# Patient Record
Sex: MALE | Race: OTHER | Employment: OTHER
[De-identification: names, ages, dates, MRNs, and addresses within clinical notes are randomized per-mention and may not be internally consistent; named-entity substitution may affect disease eponyms.]

---

## 2022-03-20 PROBLEM — C61 PROSTATE CA (HCC): Status: ACTIVE | Noted: 2020-02-03

## 2024-02-21 RX ORDER — LIDOCAINE HYDROCHLORIDE 10 MG/ML
10 INJECTION, SOLUTION INFILTRATION; PERINEURAL ONCE
Status: COMPLETED | OUTPATIENT
Start: 2024-03-01 | End: 2024-03-01

## 2024-03-01 ENCOUNTER — HOSPITAL ENCOUNTER (OUTPATIENT)
Facility: HOSPITAL | Age: 74
End: 2024-03-01
Payer: MEDICARE

## 2024-03-01 DIAGNOSIS — R97.20 ELEVATED PSA: ICD-10-CM

## 2024-03-01 PROCEDURE — C1729 CATH, DRAINAGE: HCPCS

## 2024-03-01 PROCEDURE — G0416 PROSTATE BIOPSY, ANY MTHD: HCPCS

## 2024-03-01 PROCEDURE — 55700 US BIOPSY PROSTATE NEEDLE/PUNCH: CPT | Performed by: UROLOGY

## 2024-03-01 PROCEDURE — 2500000003 HC RX 250 WO HCPCS: Performed by: UROLOGY

## 2024-03-01 RX ADMIN — LIDOCAINE HYDROCHLORIDE ANHYDROUS 10 ML: 10 INJECTION, SOLUTION INFILTRATION at 13:49

## 2024-03-01 NOTE — PROCEDURES
Gordy Godwin is a 73 y.o. male patient.      Date of Procedure: 3/1/2024     Pre-Op Diagnosis: Prostate Cancer     Post-Op Diagnosis: Same as preoperative diagnosis.       Procedure:  Transrectal US, Prostate biopsy     Surgical Assistant: None     Anesthesia: Local -- 10ml 1% lidocaine     Estimated Blood Loss (mL): Minimal     Complications: None     Specimens:   Prostate biopsies:  Left apex, left middle, left base  Right apex, right middle, right base     Implants: * No surgical log found *     Drains: * No LDAs found *     Findings: 80.73 ml volume,  no hypoechoic lesions        Detailed Description of Procedure:      After informed consent was obtained, the patient was taken to the ultrasound room and placed in the dorsal lithotomy position.       A transrectal ultrasound probe was then inserted transanally without difficulty into the rectum.  Ultrasound pictures were taken of the prostate in transverse and longitudinal views.  The prostate was measured using the standard Eliptoid formula and was found to be 80.73 mL in size.  The ultrasound views were obtained of the seminal vesicles, which were normal; the transition zone, which was normal; the right periprosthetic tissue, which was normal; and the left periprosthetic tissue, which was normal.  The left apex, mid and base, showed no hypoechoic lesions or areas of abnormality.  10ml of 1% lidocaine without epinephrine were used to infiltrate the periprostatic tissue.        At this point, using ultrasound guidance, biopsies were taken. At the left base, 2 biopsies were taken. At the left mid 2 biopsies were taken. At the left apex 2 were taken.  Then, 2 biopsies were taken at the right base, 2 biopsies were taken at the right mid, and 2 biopsies taken at the right apex.  At this point, the ultrasound probe was removed.  There was no active bleeding.  The patient was taken out of lithotomy position and walked to checkout.       NADIA CORTES MD  3/1/2024

## 2024-03-12 ENCOUNTER — HOSPITAL ENCOUNTER (OUTPATIENT)
Facility: HOSPITAL | Age: 74
Discharge: HOME | End: 2024-03-15
Payer: MEDICARE

## 2024-03-12 DIAGNOSIS — Z01.818 PRE-OP TESTING: Primary | ICD-10-CM

## 2024-03-12 LAB
ANION GAP SERPL CALC-SCNC: 2 MMOL/L (ref 3–18)
BUN SERPL-MCNC: 20 MG/DL (ref 7–18)
BUN/CREAT SERPL: 24 (ref 12–20)
CALCIUM SERPL-MCNC: 9.2 MG/DL (ref 8.5–10.1)
CHLORIDE SERPL-SCNC: 107 MMOL/L (ref 100–111)
CO2 SERPL-SCNC: 31 MMOL/L (ref 21–32)
CREAT SERPL-MCNC: 0.82 MG/DL (ref 0.6–1.3)
EKG ATRIAL RATE: 48 BPM
EKG DIAGNOSIS: NORMAL
EKG P AXIS: 39 DEGREES
EKG P-R INTERVAL: 164 MS
EKG Q-T INTERVAL: 474 MS
EKG QRS DURATION: 130 MS
EKG QTC CALCULATION (BAZETT): 423 MS
EKG R AXIS: 98 DEGREES
EKG T AXIS: 54 DEGREES
EKG VENTRICULAR RATE: 48 BPM
ERYTHROCYTE [DISTWIDTH] IN BLOOD BY AUTOMATED COUNT: 13.6 % (ref 11.6–14.5)
GLUCOSE SERPL-MCNC: 90 MG/DL (ref 74–99)
HCT VFR BLD AUTO: 39 % (ref 36–48)
HGB BLD-MCNC: 13.1 G/DL (ref 13–16)
MCH RBC QN AUTO: 31.7 PG (ref 24–34)
MCHC RBC AUTO-ENTMCNC: 33.6 G/DL (ref 31–37)
MCV RBC AUTO: 94.4 FL (ref 78–100)
NRBC # BLD: 0 K/UL (ref 0–0.01)
NRBC BLD-RTO: 0 PER 100 WBC
PLATELET # BLD AUTO: 231 K/UL (ref 135–420)
PMV BLD AUTO: 10.8 FL (ref 9.2–11.8)
POTASSIUM SERPL-SCNC: 4.6 MMOL/L (ref 3.5–5.5)
RBC # BLD AUTO: 4.13 M/UL (ref 4.35–5.65)
SODIUM SERPL-SCNC: 140 MMOL/L (ref 136–145)
WBC # BLD AUTO: 6.1 K/UL (ref 4.6–13.2)

## 2024-03-12 PROCEDURE — 80048 BASIC METABOLIC PNL TOTAL CA: CPT

## 2024-03-12 PROCEDURE — 85027 COMPLETE CBC AUTOMATED: CPT

## 2024-03-12 PROCEDURE — 93005 ELECTROCARDIOGRAM TRACING: CPT | Performed by: UROLOGY

## 2024-03-29 RX ORDER — MITOMYCIN 40 MG/80ML
40 INJECTION, POWDER, LYOPHILIZED, FOR SOLUTION INTRAVENOUS ONCE
Status: COMPLETED | OUTPATIENT
Start: 2024-04-02 | End: 2024-04-02

## 2024-03-29 RX ORDER — MITOMYCIN 40 MG/80ML
40 INJECTION, POWDER, LYOPHILIZED, FOR SOLUTION INTRAVENOUS ONCE
Status: CANCELLED | OUTPATIENT
Start: 2024-03-29 | End: 2024-03-29 | Stop reason: SDUPTHER

## 2024-04-02 ENCOUNTER — HOSPITAL ENCOUNTER (OUTPATIENT)
Facility: HOSPITAL | Age: 74
Setting detail: OUTPATIENT SURGERY
Discharge: HOME | End: 2024-04-02
Attending: UROLOGY | Admitting: UROLOGY
Payer: MEDICARE

## 2024-04-02 ENCOUNTER — ANESTHESIA EVENT (OUTPATIENT)
Facility: HOSPITAL | Age: 74
End: 2024-04-02
Payer: MEDICARE

## 2024-04-02 ENCOUNTER — ANESTHESIA (OUTPATIENT)
Facility: HOSPITAL | Age: 74
End: 2024-04-02
Payer: MEDICARE

## 2024-04-02 VITALS
OXYGEN SATURATION: 98 % | RESPIRATION RATE: 16 BRPM | TEMPERATURE: 97 F | HEIGHT: 72 IN | HEART RATE: 57 BPM | BODY MASS INDEX: 28.27 KG/M2 | DIASTOLIC BLOOD PRESSURE: 85 MMHG | WEIGHT: 208.7 LBS | SYSTOLIC BLOOD PRESSURE: 164 MMHG

## 2024-04-02 DIAGNOSIS — C61 PROSTATE CA (HCC): Primary | ICD-10-CM

## 2024-04-02 PROCEDURE — 2720000010 HC SURG SUPPLY STERILE: Performed by: UROLOGY

## 2024-04-02 PROCEDURE — 2500000003 HC RX 250 WO HCPCS: Performed by: NURSE ANESTHETIST, CERTIFIED REGISTERED

## 2024-04-02 PROCEDURE — 88341 IMHCHEM/IMCYTCHM EA ADD ANTB: CPT

## 2024-04-02 PROCEDURE — 3700000000 HC ANESTHESIA ATTENDED CARE: Performed by: UROLOGY

## 2024-04-02 PROCEDURE — 2580000003 HC RX 258: Performed by: UROLOGY

## 2024-04-02 PROCEDURE — 88307 TISSUE EXAM BY PATHOLOGIST: CPT

## 2024-04-02 PROCEDURE — 6360000002 HC RX W HCPCS: Performed by: UROLOGY

## 2024-04-02 PROCEDURE — 7100000011 HC PHASE II RECOVERY - ADDTL 15 MIN: Performed by: UROLOGY

## 2024-04-02 PROCEDURE — 88342 IMHCHEM/IMCYTCHM 1ST ANTB: CPT

## 2024-04-02 PROCEDURE — 7100000000 HC PACU RECOVERY - FIRST 15 MIN: Performed by: UROLOGY

## 2024-04-02 PROCEDURE — 6360000002 HC RX W HCPCS: Performed by: NURSE ANESTHETIST, CERTIFIED REGISTERED

## 2024-04-02 PROCEDURE — 7100000010 HC PHASE II RECOVERY - FIRST 15 MIN: Performed by: UROLOGY

## 2024-04-02 PROCEDURE — 3600000002 HC SURGERY LEVEL 2 BASE: Performed by: UROLOGY

## 2024-04-02 PROCEDURE — 3700000001 HC ADD 15 MINUTES (ANESTHESIA): Performed by: UROLOGY

## 2024-04-02 PROCEDURE — 2709999900 HC NON-CHARGEABLE SUPPLY: Performed by: UROLOGY

## 2024-04-02 PROCEDURE — 3600000012 HC SURGERY LEVEL 2 ADDTL 15MIN: Performed by: UROLOGY

## 2024-04-02 PROCEDURE — 88305 TISSUE EXAM BY PATHOLOGIST: CPT

## 2024-04-02 RX ORDER — DIPHENHYDRAMINE HYDROCHLORIDE 50 MG/ML
12.5 INJECTION INTRAMUSCULAR; INTRAVENOUS
Status: DISCONTINUED | OUTPATIENT
Start: 2024-04-02 | End: 2024-04-02 | Stop reason: HOSPADM

## 2024-04-02 RX ORDER — HYDROMORPHONE HYDROCHLORIDE 1 MG/ML
0.25 INJECTION, SOLUTION INTRAMUSCULAR; INTRAVENOUS; SUBCUTANEOUS EVERY 5 MIN PRN
Status: DISCONTINUED | OUTPATIENT
Start: 2024-04-02 | End: 2024-04-02 | Stop reason: HOSPADM

## 2024-04-02 RX ORDER — OXYCODONE HYDROCHLORIDE 5 MG/1
5 TABLET ORAL
Status: DISCONTINUED | OUTPATIENT
Start: 2024-04-02 | End: 2024-04-02 | Stop reason: HOSPADM

## 2024-04-02 RX ORDER — DROPERIDOL 2.5 MG/ML
0.62 INJECTION, SOLUTION INTRAMUSCULAR; INTRAVENOUS
Status: DISCONTINUED | OUTPATIENT
Start: 2024-04-02 | End: 2024-04-02 | Stop reason: HOSPADM

## 2024-04-02 RX ORDER — FENTANYL CITRATE 50 UG/ML
INJECTION, SOLUTION INTRAMUSCULAR; INTRAVENOUS PRN
Status: DISCONTINUED | OUTPATIENT
Start: 2024-04-02 | End: 2024-04-02 | Stop reason: SDUPTHER

## 2024-04-02 RX ORDER — NALOXONE HYDROCHLORIDE 0.4 MG/ML
INJECTION, SOLUTION INTRAMUSCULAR; INTRAVENOUS; SUBCUTANEOUS PRN
Status: DISCONTINUED | OUTPATIENT
Start: 2024-04-02 | End: 2024-04-02 | Stop reason: HOSPADM

## 2024-04-02 RX ORDER — ONDANSETRON 2 MG/ML
INJECTION INTRAMUSCULAR; INTRAVENOUS PRN
Status: DISCONTINUED | OUTPATIENT
Start: 2024-04-02 | End: 2024-04-02 | Stop reason: SDUPTHER

## 2024-04-02 RX ORDER — ONDANSETRON 2 MG/ML
4 INJECTION INTRAMUSCULAR; INTRAVENOUS
Status: DISCONTINUED | OUTPATIENT
Start: 2024-04-02 | End: 2024-04-02 | Stop reason: HOSPADM

## 2024-04-02 RX ORDER — SODIUM CHLORIDE, SODIUM LACTATE, POTASSIUM CHLORIDE, CALCIUM CHLORIDE 600; 310; 30; 20 MG/100ML; MG/100ML; MG/100ML; MG/100ML
INJECTION, SOLUTION INTRAVENOUS CONTINUOUS
Status: DISCONTINUED | OUTPATIENT
Start: 2024-04-02 | End: 2024-04-02 | Stop reason: HOSPADM

## 2024-04-02 RX ORDER — LIDOCAINE HYDROCHLORIDE 20 MG/ML
INJECTION, SOLUTION EPIDURAL; INFILTRATION; INTRACAUDAL; PERINEURAL PRN
Status: DISCONTINUED | OUTPATIENT
Start: 2024-04-02 | End: 2024-04-02 | Stop reason: SDUPTHER

## 2024-04-02 RX ORDER — SODIUM CHLORIDE 9 MG/ML
INJECTION, SOLUTION INTRAVENOUS PRN
Status: DISCONTINUED | OUTPATIENT
Start: 2024-04-02 | End: 2024-04-02 | Stop reason: HOSPADM

## 2024-04-02 RX ORDER — CEPHALEXIN 500 MG/1
500 CAPSULE ORAL 2 TIMES DAILY
Qty: 14 CAPSULE | Refills: 0 | Status: SHIPPED | OUTPATIENT
Start: 2024-04-02 | End: 2024-04-09

## 2024-04-02 RX ORDER — ROCURONIUM BROMIDE 10 MG/ML
INJECTION, SOLUTION INTRAVENOUS PRN
Status: DISCONTINUED | OUTPATIENT
Start: 2024-04-02 | End: 2024-04-02 | Stop reason: SDUPTHER

## 2024-04-02 RX ORDER — LABETALOL HYDROCHLORIDE 5 MG/ML
10 INJECTION, SOLUTION INTRAVENOUS
Status: DISCONTINUED | OUTPATIENT
Start: 2024-04-02 | End: 2024-04-02 | Stop reason: HOSPADM

## 2024-04-02 RX ORDER — SODIUM CHLORIDE 0.9 % (FLUSH) 0.9 %
5-40 SYRINGE (ML) INJECTION PRN
Status: DISCONTINUED | OUTPATIENT
Start: 2024-04-02 | End: 2024-04-02 | Stop reason: HOSPADM

## 2024-04-02 RX ORDER — SODIUM CHLORIDE 0.9 % (FLUSH) 0.9 %
5-40 SYRINGE (ML) INJECTION EVERY 12 HOURS SCHEDULED
Status: DISCONTINUED | OUTPATIENT
Start: 2024-04-02 | End: 2024-04-02 | Stop reason: HOSPADM

## 2024-04-02 RX ORDER — TRAMADOL HYDROCHLORIDE 50 MG/1
50 TABLET ORAL EVERY 6 HOURS PRN
Qty: 12 TABLET | Refills: 0 | Status: SHIPPED | OUTPATIENT
Start: 2024-04-02 | End: 2024-04-05

## 2024-04-02 RX ORDER — PHENAZOPYRIDINE HYDROCHLORIDE 100 MG/1
100 TABLET, FILM COATED ORAL 3 TIMES DAILY PRN
Qty: 30 TABLET | Refills: 0 | Status: SHIPPED | OUTPATIENT
Start: 2024-04-02 | End: 2024-04-12

## 2024-04-02 RX ORDER — PROPOFOL 10 MG/ML
INJECTION, EMULSION INTRAVENOUS PRN
Status: DISCONTINUED | OUTPATIENT
Start: 2024-04-02 | End: 2024-04-02 | Stop reason: SDUPTHER

## 2024-04-02 RX ORDER — FENTANYL CITRATE 50 UG/ML
25 INJECTION, SOLUTION INTRAMUSCULAR; INTRAVENOUS EVERY 5 MIN PRN
Status: DISCONTINUED | OUTPATIENT
Start: 2024-04-02 | End: 2024-04-02 | Stop reason: HOSPADM

## 2024-04-02 RX ADMIN — PROPOFOL 150 MG: 10 INJECTION, EMULSION INTRAVENOUS at 07:33

## 2024-04-02 RX ADMIN — PROPOFOL 120 MG: 10 INJECTION, EMULSION INTRAVENOUS at 07:43

## 2024-04-02 RX ADMIN — LIDOCAINE HYDROCHLORIDE 100 MG: 20 INJECTION, SOLUTION EPIDURAL; INFILTRATION; INTRACAUDAL; PERINEURAL at 07:33

## 2024-04-02 RX ADMIN — FENTANYL CITRATE 25 MCG: 50 INJECTION, SOLUTION INTRAMUSCULAR; INTRAVENOUS at 08:10

## 2024-04-02 RX ADMIN — SODIUM CHLORIDE, SODIUM LACTATE, POTASSIUM CHLORIDE, AND CALCIUM CHLORIDE: 600; 310; 30; 20 INJECTION, SOLUTION INTRAVENOUS at 06:11

## 2024-04-02 RX ADMIN — MITOMYCIN 40 MG: 40 INJECTION, POWDER, LYOPHILIZED, FOR SOLUTION INTRAVENOUS at 08:23

## 2024-04-02 RX ADMIN — ONDANSETRON HYDROCHLORIDE 4 MG: 2 INJECTION INTRAMUSCULAR; INTRAVENOUS at 07:53

## 2024-04-02 RX ADMIN — FENTANYL CITRATE 25 MCG: 50 INJECTION, SOLUTION INTRAMUSCULAR; INTRAVENOUS at 07:27

## 2024-04-02 RX ADMIN — ROCURONIUM BROMIDE 20 MG: 10 INJECTION, SOLUTION INTRAVENOUS at 07:47

## 2024-04-02 RX ADMIN — FENTANYL CITRATE 25 MCG: 50 INJECTION, SOLUTION INTRAMUSCULAR; INTRAVENOUS at 08:12

## 2024-04-02 RX ADMIN — WATER 2000 MG: 1 INJECTION INTRAMUSCULAR; INTRAVENOUS; SUBCUTANEOUS at 07:38

## 2024-04-02 RX ADMIN — FENTANYL CITRATE 25 MCG: 50 INJECTION, SOLUTION INTRAMUSCULAR; INTRAVENOUS at 07:52

## 2024-04-02 RX ADMIN — SODIUM CHLORIDE, SODIUM LACTATE, POTASSIUM CHLORIDE, AND CALCIUM CHLORIDE: 600; 310; 30; 20 INJECTION, SOLUTION INTRAVENOUS at 08:14

## 2024-04-02 RX ADMIN — SUGAMMADEX 200 MG: 100 INJECTION, SOLUTION INTRAVENOUS at 08:23

## 2024-04-02 RX ADMIN — ROCURONIUM BROMIDE 30 MG: 10 INJECTION, SOLUTION INTRAVENOUS at 07:40

## 2024-04-02 ASSESSMENT — PAIN - FUNCTIONAL ASSESSMENT: PAIN_FUNCTIONAL_ASSESSMENT: 0-10

## 2024-04-02 NOTE — BRIEF OP NOTE
Brief Postoperative Note      Patient: Gordy Godwin  YOB: 1950  MRN: [id number]    Date of Procedure: 4/2/2024    Pre-Op Diagnosis Codes:     * Bladder neoplasm [D49.4]    Post-Op Diagnosis: Same       Procedure(s):  CYSTOSCOPY TRANSURETHRAL RESECTION OF BLADDER TUMOR (LARGE) WITH INTRAVESICAL INSTILLATION OF MITOMYCIN    Surgeon(s):  Jaylen Cortse MD    Assistant:  none    Anesthesia: general    Estimated Blood Loss (mL): Minimal    Complications: None    Specimens:   ID Type Source Tests Collected by Time Destination   A : bladder tumor Tissue Bladder SURGICAL PATHOLOGY Jaylen Cortes MD 4/2/2024 0837    B : bladder tumor deep Tissue Bladder SURGICAL PATHOLOGY Jaylen Cortes MD 4/2/2024 0837        Implants:  * No implants in log *      Drains:   Urinary Catheter 04/02/24 Coude (Active)   Catheter Indications Perioperative use for selected surgical procedures 04/02/24 0839   Collection Container Standard 04/02/24 0839   Catheter Best Practices  Drainage tube clipped to bed;Catheter secured to thigh;Tamper seal intact;Bag below bladder;Bag not on floor;Drainage bag less than half full;Lack of dependent loop in tubing 04/02/24 0839   Status Clamped 04/02/24 0839       Findings: 5.5cm tumor at bladder neck extending toward trigone and right lateral wall from 6-oclock position to 12 oclock.   Uo's unharmed during the resection      Electronically signed by JAYLEN CORTES MD on 4/2/2024 at 8:58 AM

## 2024-04-02 NOTE — PERIOP NOTE
TRANSFER - IN REPORT:    Verbal report received from OR RN on Gordy Gross  being received from OR for routine progression of patient care      Report consisted of patient's Situation, Background, Assessment and   Recommendations(SBAR).     Information from the following report(s) Adult Overview, Surgery Report, Intake/Output, and MAR was reviewed with the receiving nurse.    Opportunity for questions and clarification was provided.      Assessment completed upon patient's arrival to unit and care assumed.

## 2024-04-02 NOTE — H&P
[facility]  [address]  [facility] [address]  Tel:  [phone]  Fax: [phone]    Patient : Gordy Godwin   YOB: 1950                   Assessment/Plan  # Detail Type Description    1. 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    Patient Plan He has a 4 cm bladder tumor.  There was about a 2.5 cm area of papillary tumor surrounded by some base of erythema that seems raised.  We will do a cystoscopy and Transurethral resection of bladder tumor large.  We will do intravesical instillation of mitomycin.  Risks of bleeding and infection and injury and possible need for future procedures were all discussed.  We discussed that this may be curative in nature or the possibility that future procedures or interventions are necessary.  We discussed that this should provide a stage and a diagnosis         2. Assessment Gross hematuria (R31.0).    Patient Plan His hematuria evaluation is significant for a medium-sized bladder mass that is likely bladder in its origin but could theoretically be emanating from the prostate and growing into the bladder    Provider Plan     Plan Orders          3. Assessment Prostate cancer (C61).    Patient Plan          4. Assessment Feeling of incomplete bladder emptying (R39.14).    Patient Plan Previously his PVR is 35 mL week ago              Additional Visit Information    This 73 year old patient presents for Hematuria and Prostate cancer.    History of Present Illness  1.  Hematuria   The patient presents with hematuria (gross).  The problem began 1 month ago. The symptoms are intermittent. The problem is unchanged. Pertinent history includes being at least 40 years of age. Denies aggravating factors. The patient denies chills, fever, nausea and vomiting.           Comments: CT 2/2024--   1. Lobulated enhancing mass posteriorly in the urinary bladder on the right near the   ureterovesicular junction suspicious for

## 2024-04-02 NOTE — PERIOP NOTE
TRANSFER - IN REPORT:    Verbal report received from MAREN Anglin (name) on Gordy Gross  being received from PACU (unit) for routine progression of patient care      Report consisted of patient’s Situation, Background, Assessment and   Recommendations(SBAR).     Information from the following report(s) Nurse Handoff Report, Surgery Report, Intake/Output, and MAR was reviewed with the receiving nurse.    Opportunity for questions and clarification was provided.      Assessment completed upon patient’s arrival to unit and care assume

## 2024-04-02 NOTE — ANESTHESIA PRE PROCEDURE
Department of Anesthesiology  Preprocedure Note       Name:  Gordy Godwin   Age:  73 y.o.  :  1950                                          MRN:  [id number]         Date:  2024      Surgeon: Surgeon(s):  Jaylen Schroeder MD    Procedure: Procedure(s):  CYSTOSCOPY TRANSURETHRAL RESECTION OF BLADDER TUMOR MEDIUM WITH INTRAVESICAL INSTILLATION OF MITOMYCIN    Medications prior to admission:   Prior to Admission medications    Medication Sig Start Date End Date Taking? Authorizing Provider   Multiple Vitamins-Minerals (THERAPEUTIC MULTIVITAMIN-MINERALS) tablet Take 1 tablet by mouth daily    Provider, MD Jay       Current medications:    Current Facility-Administered Medications   Medication Dose Route Frequency Provider Last Rate Last Admin   • lactated ringers IV soln infusion   IntraVENous Continuous Jaylen Schroeder  mL/hr at 24 0611 New Bag at 24 0611   • ceFAZolin (ANCEF) 2,000 mg in sterile water 20 mL IV syringe  2,000 mg IntraVENous On Call to OR Jaylen Schroeder MD       • mitoMYcin (MUTAMYCIN) chemo bladder instillation 40 mg  40 mg Bladder Instillation Once Jaylen Scrhoeder MD           Allergies:  No Known Allergies    Problem List:    Patient Active Problem List   Diagnosis Code   • Prostate CA (HCC) C61       Past Medical History:        Diagnosis Date   • Bronchitis    • Nephrolithiasis    • Presbyopia    • Ribs, multiple fractures     5-6 ribs   • Wears glasses        Past Surgical History:        Procedure Laterality Date   • COLONOSCOPY     • RETINAL DETACHMENT SURGERY Right    • TONSILLECTOMY  1960   • TOTAL KNEE ARTHROPLASTY     • US PROSTATE NEEDLE PUNCH  2024    US PROSTATE NEEDLE PUNCH 3/1/2024 [facility] RAD US- also    • WISDOM TOOTH EXTRACTION         Social History:    Social History     Tobacco Use   • Smoking status: Former     Current packs/day: 0.00     Types: Cigarettes     Quit date:      Years since quitting: 15.2

## 2024-04-02 NOTE — DISCHARGE INSTRUCTIONS
Resume pre hospital diet  Drink plenty of fluids to keep the urine clear  Ambulate in house  Take prescriptions as directed  Keep temple catheter bag emptied- do not allow to overfill  Keep temple bag lower than your bladder but not resting on the floor  Dr. Schroeder's office will call with follow up appointment    For the next 48 hours:    If possible use separate toilet from others in house  Urinate in toilet - add 1 cup of BLEACH - close  Lid and double flush  Clean urine spills on self with soap and water , urine spills on toilet with Bleach wipes  Wash clothes separate from others in house       Cystoscopy: What to Expect at Home  Your Recovery     A cystoscopy is a procedure that lets a doctor look inside of the bladder and the urethra. The urethra is the tube that carries urine from the bladder to outside the body. The doctor uses a thin, lighted tool called a cystoscope. Your bladder is filled with fluid. This stretches the bladder so that your doctor can look closely at the inside of your bladder.  After the cystoscopy, your urethra may be sore at first, and it may burn when you urinate for the first few days after the procedure. You may feel the need to urinate more often, and your urine may be pink. These symptoms should get better in 1 or 2 days. You will probably be able to go back to most of your usual activities in 1 or 2 days.  This care sheet gives you a general idea about how long it will take for you to recover. But each person recovers at a different pace. Follow the steps below to get better as quickly as possible.  How can you care for yourself at home?  Activity    Rest when you feel tired. Getting enough sleep will help you recover.     Try to walk each day. Start by walking a little more than you did the day before. Bit by bit, increase the amount you walk. Walking boosts blood flow and helps prevent pneumonia and constipation.     Avoid strenuous activities, such as bicycle riding, jogging,

## 2024-04-02 NOTE — ANESTHESIA POSTPROCEDURE EVALUATION
Post-Anesthesia Evaluation and Assessment    Cardiovascular Function/Vital Signs/Pain Score:  Vitals  BP: 138/86  Temp: 98.7 °F (37.1 °C)  Temp Source: Temporal  Pulse: 59  Respirations: 17  SpO2: 95 %  Height: 182.9 cm (6')  Weight - Scale: 94.7 kg (208 lb 11.2 oz)  Pain Level: 0    Patient is status post Procedure(s):  CYSTOSCOPY TRANSURETHRAL RESECTION OF BLADDER TUMOR LARGE WITH INTRAVESICAL INSTILLATION OF MITOMYCIN.    Nausea/Vomiting: Controlled.    Postoperative hydration reviewed and adequate.    Pain:  Managed    Mental Status and Level of Consciousness: Baseline and appropriate for discharge.    Adequate oxygenation and airway patent.    Complications related to anesthesia: None    Post-anesthesia assessment completed. No concerns.    Patient has met all discharge requirements.    Signed By: Carlos Alberto Pacheco MD    April 2, 2024

## 2024-04-02 NOTE — INTERVAL H&P NOTE
Update History & Physical    The patient's History and Physical of April 1, 2024 was reviewed with the patient and I examined the patient. There was no change. The surgical site was confirmed by the patient and me.     Plan: The risks, benefits, expected outcome, and alternative to the recommended procedure have been discussed with the patient. Patient understands and wants to proceed with the procedure.     Electronically signed by NADIA CORTES MD on 4/2/2024 at 7:11 AM

## 2024-04-02 NOTE — PERIOP NOTE
TRANSFER - OUT REPORT:    Verbal report given to [place] ENGEL  on Gordy Godwin  being transferred to Phase 2  for routine post-op       Report consisted of patient's Situation, Background, Assessment and   Recommendations(SBAR).     Information from the following report(s) Nurse Handoff Report, Adult Overview, Surgery Report, Intake/Output, and MAR was reviewed with the receiving nurse.           Lines:   Peripheral IV 04/02/24 Posterior;Right Hand (Active)   Site Assessment Clean, dry & intact 04/02/24 0846   Line Status Infusing 04/02/24 0846   Phlebitis Assessment No symptoms 04/02/24 0846   Infiltration Assessment 0 04/02/24 0846   Dressing Status Clean, dry & intact 04/02/24 0846   Dressing Type Transparent 04/02/24 0846        Opportunity for questions and clarification was provided.      Patient transported with:  Registered Nurse

## 2024-04-03 PROCEDURE — 51702 INSERT TEMP BLADDER CATH: CPT

## 2024-04-03 PROCEDURE — 99283 EMERGENCY DEPT VISIT LOW MDM: CPT

## 2024-04-03 NOTE — OP NOTE
[address]  [address]                            OPERATIVE REPORT      PATIENT NAME: BETSY MACIAS                  : 1950  MED REC NO: [id number]                       ROOM: OR  ACCOUNT NO: [id number]                       ADMIT DATE: 2024  PROVIDER: Jaylen Schroeder MD    DATE OF SERVICE:  2024    PREOPERATIVE DIAGNOSES:  Neoplasm of unspecified behavior of bladder.    POSTOPERATIVE DIAGNOSES:  Neoplasm of unspecified behavior of bladder.    PROCEDURES PERFORMED:  Cystoscopy; transurethral resection of bladder tumor, large, with intravesical instillation of mitomycin.    SURGEON:  Jaylen Schroeder MD    ASSISTANT:  None.    ANESTHESIA:  General.    ESTIMATED BLOOD LOSS:  Minimal.    SPECIMENS REMOVED:       1. Bladder tumor.     2. Bladder tumor deep.    INTRAOPERATIVE FINDINGS:  The patient had a 5.5 cm tumor at the bladder neck extending toward the trigone and right lateral wall from the 6 o'clock position up the right side to the 12 o'clock position.  The UOs were unharmed during the resection.     COMPLICATIONS:  None.    IMPLANTS:  None.    INDICATIONS:  The patient is a 73-year-old gentleman with a history of prostate cancer, who has also had gross hematuria and is noted to have a bladder tumor.  The tumor was 4 cm with another 1.5 cm of surrounding erythematous tissue.  He presents for bladder tumor resection.    DESCRIPTION OF PROCEDURE:  Preoperatively, the risks and benefits of the surgery were discussed with the patient.  The risks include but are not limited to bleeding, infection, injury to bladder, injury to the ureter, and possible need for future procedures.  The patient assumed the risks and signed the informed consent.  The patient was taken to the operating room and placed on the OR table in a supine position.  He was administered general anesthetic.  He was administered intravenous antibiotics.  He was

## 2024-04-04 ENCOUNTER — HOSPITAL ENCOUNTER (EMERGENCY)
Facility: HOSPITAL | Age: 74
Discharge: HOME | End: 2024-04-04
Attending: EMERGENCY MEDICINE
Payer: MEDICARE

## 2024-04-04 VITALS
SYSTOLIC BLOOD PRESSURE: 133 MMHG | DIASTOLIC BLOOD PRESSURE: 84 MMHG | TEMPERATURE: 97.5 F | WEIGHT: 205 LBS | HEART RATE: 94 BPM | OXYGEN SATURATION: 95 % | RESPIRATION RATE: 18 BRPM | BODY MASS INDEX: 27.8 KG/M2

## 2024-04-04 DIAGNOSIS — Z97.8 INDWELLING FOLEY CATHETER PRESENT: ICD-10-CM

## 2024-04-04 DIAGNOSIS — R30.0 DYSURIA: Primary | ICD-10-CM

## 2024-04-04 DIAGNOSIS — R82.81 PYURIA: ICD-10-CM

## 2024-04-04 LAB
APPEARANCE UR: ABNORMAL
BACTERIA URNS QL MICRO: ABNORMAL /HPF
BILIRUB UR QL: ABNORMAL
COLOR UR: ABNORMAL
EPITH CASTS URNS QL MICRO: ABNORMAL /LPF (ref 0–5)
GLUCOSE UR STRIP.AUTO-MCNC: NEGATIVE MG/DL
HGB UR QL STRIP: ABNORMAL
KETONES UR QL STRIP.AUTO: NEGATIVE MG/DL
LEUKOCYTE ESTERASE UR QL STRIP.AUTO: ABNORMAL
NITRITE UR QL STRIP.AUTO: POSITIVE
PH UR STRIP: 5 (ref 5–8)
PROT UR STRIP-MCNC: 300 MG/DL
RBC #/AREA URNS HPF: ABNORMAL /HPF (ref 0–5)
SP GR UR REFRACTOMETRY: 1.03 (ref 1–1.03)
UROBILINOGEN UR QL STRIP.AUTO: 1 EU/DL (ref 0.2–1)
WBC URNS QL MICRO: ABNORMAL /HPF (ref 0–5)

## 2024-04-04 PROCEDURE — 81001 URINALYSIS AUTO W/SCOPE: CPT

## 2024-04-04 PROCEDURE — 6370000000 HC RX 637 (ALT 250 FOR IP): Performed by: EMERGENCY MEDICINE

## 2024-04-04 PROCEDURE — 87086 URINE CULTURE/COLONY COUNT: CPT

## 2024-04-04 RX ORDER — LEVOFLOXACIN 250 MG/1
500 TABLET, FILM COATED ORAL DAILY
Qty: 6 TABLET | Refills: 0 | Status: SHIPPED | OUTPATIENT
Start: 2024-04-04 | End: 2024-04-07

## 2024-04-04 RX ORDER — LEVOFLOXACIN 500 MG/1
500 TABLET, FILM COATED ORAL
Status: COMPLETED | OUTPATIENT
Start: 2024-04-04 | End: 2024-04-04

## 2024-04-04 RX ADMIN — LEVOFLOXACIN 500 MG: 500 TABLET, FILM COATED ORAL at 03:14

## 2024-04-04 ASSESSMENT — PAIN - FUNCTIONAL ASSESSMENT: PAIN_FUNCTIONAL_ASSESSMENT: 0-10

## 2024-04-04 ASSESSMENT — PAIN SCALES - GENERAL: PAINLEVEL_OUTOF10: 8

## 2024-04-04 NOTE — ED PROVIDER NOTES
EMERGENCY DEPARTMENT HISTORY AND PHYSICAL EXAM    Date: 4/4/2024  Patient Name: Gordy Godwin    History of Presenting Illness     Chief Complaint   Patient presents with   • Urinary Burning     Patient has a catheter in place from his bladder cancer surgery yesterday. Patient states that every time he urinates with the catheter he is experiencing pain          History Provided By: Patient    Additional History (Context):   2:23 AM EDT  Gordy Godwin is a 73 y.o. male with PMHX of prostate cancer who presents to the emergency department C/O burning irritation associated with Pedraza catheter.  Patient reports history of prostate cancer having had procedure done only yesterday.    He is currently using Keflex antibiotics as well as Pyridium and Ultram for pain.  He was discharged home with indwelling Pedraza catheter currently taking Pyridium with orange urine as expected in his Pedraza catheter.  He reports he cannot get comfortable overnight persistent irritation.  He does acknowledge ongoing symptoms of urinary urgency.  He denies pain in the abdomen nausea vomiting or back pain.    Social History  No smoking drinking or drugs, quit smoking in 2009.    Family History  Family history listed below.    PCP: Dayo Mack,     No current facility-administered medications for this encounter.     Current Outpatient Medications   Medication Sig Dispense Refill   • levoFLOXacin (LEVAQUIN) 250 MG tablet Take 2 tablets by mouth daily for 3 days 6 tablet 0   • phenazopyridine (PYRIDIUM) 100 MG tablet Take 1 tablet by mouth 3 times daily as needed for Pain 30 tablet 0   • traMADol (ULTRAM) 50 MG tablet Take 1 tablet by mouth every 6 hours as needed for Pain for up to 3 days. Intended supply: 3 days. Take lowest dose possible to manage pain Max Daily Amount: 200 mg 12 tablet 0   • cephALEXin (KEFLEX) 500 MG capsule Take 1 capsule by mouth 2 times daily for 7 days 14 capsule 0   • Multiple Vitamins-Minerals (THERAPEUTIC

## 2024-04-04 NOTE — ED NOTES
Patient arrived to ED for complaints of burning at catheter site and leakage. Patient has cystoscopy yesterday, and had a temple placed but has stated that the burning around the site has become unbearable.    Patient resting on the stretcher at this time.     Chest rise and fall noted. VSS.     No further complaints at this time.     Care continues, awaiting to be seen by provider.

## 2024-04-05 LAB
BACTERIA SPEC CULT: NORMAL
SERVICE CMNT-IMP: NORMAL

## (undated) DEVICE — GARMENT,MEDLINE,DVT,INT,CALF,MED, GEN2: Brand: MEDLINE

## (undated) DEVICE — SOLUTION IRRIG 3000ML 0.9% SOD CHL FLX CONT 0797208] ICU MEDICAL INC]

## (undated) DEVICE — CATHETER URETH 22FR BLLN 5CC SIL HYDRGEL 2 W F SPEC CARS M

## (undated) DEVICE — BASIC SINGLE BASIN 1-LF: Brand: MEDLINE INDUSTRIES, INC.

## (undated) DEVICE — STRAP,POSITIONING,KNEE/BODY,FOAM,4X60": Brand: MEDLINE

## (undated) DEVICE — CUTTING LOOP, BIPOLAR, 24/26 FR.: Brand: N.A.

## (undated) DEVICE — DRAINBAG,ANTI-REFLUX TOWER,L/F,2000ML,LL: Brand: MEDLINE

## (undated) DEVICE — CYSTO PACK: Brand: MEDLINE INDUSTRIES, INC.

## (undated) DEVICE — ELECTRODE PT RET AD L9FT HI MOIST COND ADH HYDRGEL CORDED

## (undated) DEVICE — STERILE LATEX POWDER-FREE SURGICAL GLOVESWITH NITRILE COATING: Brand: PROTEXIS

## (undated) DEVICE — PAD,NON-ADHERENT,3X8,STERILE,LF,1/PK: Brand: MEDLINE